# Patient Record
Sex: FEMALE
[De-identification: names, ages, dates, MRNs, and addresses within clinical notes are randomized per-mention and may not be internally consistent; named-entity substitution may affect disease eponyms.]

---

## 2019-05-29 PROBLEM — Z00.00 ENCOUNTER FOR PREVENTIVE HEALTH EXAMINATION: Status: ACTIVE | Noted: 2019-05-29

## 2019-06-05 ENCOUNTER — APPOINTMENT (OUTPATIENT)
Dept: OTOLARYNGOLOGY | Facility: CLINIC | Age: 54
End: 2019-06-05
Payer: COMMERCIAL

## 2019-06-05 ENCOUNTER — TRANSCRIPTION ENCOUNTER (OUTPATIENT)
Age: 54
End: 2019-06-05

## 2019-06-05 VITALS
BODY MASS INDEX: 30.13 KG/M2 | DIASTOLIC BLOOD PRESSURE: 80 MMHG | SYSTOLIC BLOOD PRESSURE: 159 MMHG | HEART RATE: 44 BPM | HEIGHT: 67 IN | OXYGEN SATURATION: 100 % | TEMPERATURE: 97.9 F | WEIGHT: 192 LBS | RESPIRATION RATE: 17 BRPM

## 2019-06-05 VITALS — WEIGHT: 192 LBS | BODY MASS INDEX: 30.13 KG/M2 | HEIGHT: 67 IN

## 2019-06-05 DIAGNOSIS — H69.81 OTHER SPECIFIED DISORDERS OF EUSTACHIAN TUBE, RIGHT EAR: ICD-10-CM

## 2019-06-05 DIAGNOSIS — H93.11 TINNITUS, RIGHT EAR: ICD-10-CM

## 2019-06-05 DIAGNOSIS — Z78.9 OTHER SPECIFIED HEALTH STATUS: ICD-10-CM

## 2019-06-05 DIAGNOSIS — Z86.79 PERSONAL HISTORY OF OTHER DISEASES OF THE CIRCULATORY SYSTEM: ICD-10-CM

## 2019-06-05 PROCEDURE — 92550 TYMPANOMETRY & REFLEX THRESH: CPT

## 2019-06-05 PROCEDURE — 92557 COMPREHENSIVE HEARING TEST: CPT

## 2019-06-05 PROCEDURE — 99243 OFF/OP CNSLTJ NEW/EST LOW 30: CPT | Mod: 25

## 2019-06-05 PROCEDURE — 31231 NASAL ENDOSCOPY DX: CPT

## 2019-06-05 PROCEDURE — 92700A: CUSTOM

## 2019-06-05 RX ORDER — NAPROXEN SODIUM 220 MG
TABLET ORAL
Refills: 0 | Status: ACTIVE | COMMUNITY

## 2019-06-07 NOTE — PROCEDURE
[Posterior Lesion] : posterior lesion [Anterior rhinoscopy insufficient to account for symptoms] : anterior rhinoscopy insufficient to account for symptoms [Topical Lidocaine] : topical lidocaine [Oxymetazoline HCl] : oxymetazoline HCl [Flexible Endoscope] : examined with the flexible endoscope [Serial Number: ___] : Serial Number: [unfilled] [Normal] : the paranasal sinuses had no abnormalities [FreeTextEntry6] : done to  ro npx lesion cusing pulsatle tinntius

## 2019-06-07 NOTE — DATA REVIEWED
[de-identified] : normal and symmetric up to 16 khz [de-identified] : mri cannot open on our computers

## 2019-06-19 ENCOUNTER — APPOINTMENT (OUTPATIENT)
Dept: OTOLARYNGOLOGY | Facility: CLINIC | Age: 54
End: 2019-06-19
Payer: COMMERCIAL

## 2019-06-19 VITALS
OXYGEN SATURATION: 98 % | RESPIRATION RATE: 15 BRPM | SYSTOLIC BLOOD PRESSURE: 153 MMHG | TEMPERATURE: 98 F | HEART RATE: 58 BPM | DIASTOLIC BLOOD PRESSURE: 76 MMHG

## 2019-06-19 DIAGNOSIS — H93.A1 PULSATILE TINNITUS, RIGHT EAR: ICD-10-CM

## 2019-06-19 PROCEDURE — 99214 OFFICE O/P EST MOD 30 MIN: CPT

## 2019-06-19 NOTE — REASON FOR VISIT
[FreeTextEntry2] : followup r pulsatile tinnitus [Subsequent Evaluation] : a subsequent evaluation for

## 2019-06-19 NOTE — DATA REVIEWED
[de-identified] :  normal [de-identified] : mri brain only cspine arthritis mra brain and neck mrv brain all unremarkable reviewed reports with pt

## 2019-06-19 NOTE — HISTORY OF PRESENT ILLNESS
[de-identified] : followup 54 yo woman with r pulsatile tinnitus- has had for mo; it can be severe. had mra head and neck and mrv and here to review.Mri had already been done by Dr Hankins and was unremarkable. She denies hl or vertigo. She grinds her teeth and uses a mouth guard intermittently which she purchased otc.

## 2022-02-03 NOTE — HISTORY OF PRESENT ILLNESS
[de-identified] : 52 yo woman with r pulsatile tinnitus/sometime continuous high pitched tinnitus for a several months - it began insidiously - she denies hl or vertigo. Referred by Dr Hankins. She also had mri yesterday and brought cd but no report. She said she had  with another physician about a yr ago and was told it was normal. She admits to bruxism. nose is not congested. nonsmoker. no fh relevant to cc
Cardiac Monitor/Defib/ACLS/Rescue Kit/O2/BVM/oxygen/pulse ox/suction/ACLS Rescue Kit

## 2024-05-14 ENCOUNTER — NON-APPOINTMENT (OUTPATIENT)
Age: 59
End: 2024-05-14